# Patient Record
Sex: FEMALE | Race: WHITE | NOT HISPANIC OR LATINO | Employment: FULL TIME | URBAN - METROPOLITAN AREA
[De-identification: names, ages, dates, MRNs, and addresses within clinical notes are randomized per-mention and may not be internally consistent; named-entity substitution may affect disease eponyms.]

---

## 2020-05-29 ENCOUNTER — OFFICE VISIT (OUTPATIENT)
Dept: NEUROLOGY | Facility: CLINIC | Age: 70
End: 2020-05-29
Payer: COMMERCIAL

## 2020-05-29 VITALS
HEIGHT: 66 IN | DIASTOLIC BLOOD PRESSURE: 83 MMHG | SYSTOLIC BLOOD PRESSURE: 142 MMHG | WEIGHT: 152 LBS | HEART RATE: 70 BPM | BODY MASS INDEX: 24.43 KG/M2

## 2020-05-29 DIAGNOSIS — S34.21XA: ICD-10-CM

## 2020-05-29 DIAGNOSIS — E03.9 HYPOTHYROIDISM, UNSPECIFIED TYPE: ICD-10-CM

## 2020-05-29 PROCEDURE — 1101F PR PT FALLS ASSESS DOC 0-1 FALLS W/OUT INJ PAST YR: ICD-10-PCS | Mod: CPTII,S$GLB,, | Performed by: PSYCHIATRY & NEUROLOGY

## 2020-05-29 PROCEDURE — 99999 PR PBB SHADOW E&M-NEW PATIENT-LVL III: ICD-10-PCS | Mod: PBBFAC,,, | Performed by: PSYCHIATRY & NEUROLOGY

## 2020-05-29 PROCEDURE — 99205 PR OFFICE/OUTPT VISIT, NEW, LEVL V, 60-74 MIN: ICD-10-PCS | Mod: S$GLB,,, | Performed by: PSYCHIATRY & NEUROLOGY

## 2020-05-29 PROCEDURE — 99999 PR PBB SHADOW E&M-NEW PATIENT-LVL III: CPT | Mod: PBBFAC,,, | Performed by: PSYCHIATRY & NEUROLOGY

## 2020-05-29 PROCEDURE — 1101F PT FALLS ASSESS-DOCD LE1/YR: CPT | Mod: CPTII,S$GLB,, | Performed by: PSYCHIATRY & NEUROLOGY

## 2020-05-29 PROCEDURE — 1159F MED LIST DOCD IN RCRD: CPT | Mod: S$GLB,,, | Performed by: PSYCHIATRY & NEUROLOGY

## 2020-05-29 PROCEDURE — 1126F AMNT PAIN NOTED NONE PRSNT: CPT | Mod: S$GLB,,, | Performed by: PSYCHIATRY & NEUROLOGY

## 2020-05-29 PROCEDURE — 99205 OFFICE O/P NEW HI 60 MIN: CPT | Mod: S$GLB,,, | Performed by: PSYCHIATRY & NEUROLOGY

## 2020-05-29 PROCEDURE — 1126F PR PAIN SEVERITY QUANTIFIED, NO PAIN PRESENT: ICD-10-PCS | Mod: S$GLB,,, | Performed by: PSYCHIATRY & NEUROLOGY

## 2020-05-29 PROCEDURE — 1159F PR MEDICATION LIST DOCUMENTED IN MEDICAL RECORD: ICD-10-PCS | Mod: S$GLB,,, | Performed by: PSYCHIATRY & NEUROLOGY

## 2020-05-29 RX ORDER — LEVOTHYROXINE SODIUM 75 UG/1
0.17 TABLET ORAL
COMMUNITY

## 2020-05-29 NOTE — ASSESSMENT & PLAN NOTE
Ms. Nichols is a 68 yo female w/ PMH significant for hypothyroidism is seen in consultation as self-referral for evaluation and management of gait abnormality.    Her exam is notable for L foot drop, decreased L patellar reflex.  Her exam is most consistent with an L5 nerve root compression.    We discussed further potential evaluations with MRI, EMG/NCS, but given her stable course since onset and very mild symptoms at this time, we will hold off.      Recommendations and Plan  -Reviewed lumbar exercises, gave information on sciatica (discussed that instructions provided are similar, but technically not the same diagnosis as sciatica)  -Return to clinic as needed or if symptoms worsen for further workup.

## 2020-05-29 NOTE — PATIENT INSTRUCTIONS
"Search images for "exercises for sciatica"  Sciatica    Sciatica is a condition that causes pain in the lower back that spreads down into the buttock, hip, and leg. Sometimes the leg pain can happen without any back pain. Sciatica happens when a spinal nerve is irritated or has pressure put on it as comes out of the spinal canal in the lower back. This most often happens when a bulge or rupture of a nearby spinal disk presses on the nerve. Sciatica can also be caused by a narrowing of the spinal canal (spinal stenosis) or spasm of the muscle in the buttocks that the sciatic nerve passes through (pyriform muscle). Sciatica is also called lumbar radiculopathy.  Sciatica may begin after a sudden twisting or bending force, such as in a car accident. Or it can happen after a simple awkward movement. In either case, muscle spasm often also happens. Muscle spasm makes the pain worse.  A healthcare provider makes a diagnosis of sciatica from your symptoms and a physical exam. Unless you had an injury from a car accident or fall, you usually wont have X-rays taken at this time. This is because the nerves and disks in your back cant be seen on an X-ray. If the provider sees signs of a compressed nerve, you will need to schedule an MRI scan as an outpatient. Signs of a compressed nerve include loss of strength in a leg.  Most sciatica gets better with medicine, exercise, and physical therapy. If your symptoms continue after at least 3 months of medical treatment, you may need surgery or injections to your lower back.  Home care  Follow these tips when caring for yourself at home:  · You may need to stay in bed the first few days. But as soon as possible, begin sitting up or walking. This will help you avoid problems that come from staying in bed for long periods.  · When in bed, try to find a position that is comfortable. A firm mattress is best. Try lying flat on your back with pillows under your knees. You can also try " lying on your side with your knees bent up toward your chest and a pillow between your knees.  · Avoid sitting for long periods. This puts more stress on your lower back than standing or walking.  · Use heat from a hot shower, hot bath, or heating pad to help ease pain. Massage can also help. You can also try using an ice pack. You can make your own ice pack by putting ice cubes in a plastic bag. Wrap the bag in a thin towel. Try both heat and cold to see which works best. Use the method that feels best for 20 minutes several times a day.  · You may use acetaminophen or ibuprofen to ease pain, unless another pain medicine was prescribed. Note: If you have chronic liver or kidney disease, talk with your healthcare provider before taking these medicines. Also talk with your provider if youve had a stomach ulcer or gastrointestinal bleeding.  ·  lifting methods. Dont lift anything heavier than 15 pounds until all of the pain is gone.Use safe  Follow-up care  Follow up with your healthcare provider, or as advised. You may need physical therapy or additional tests.  If X-rays were taken, a radiologist will look at them. You will be told of any new findings that may affect your care.  When to seek medical advice  Call your healthcare provider right away if any of these occur:  · Pain gets worse even after taking prescribed medicine  · Weakness or numbness in 1 or both legs or hips  · Numbness in your groin or genital area  · You cant control your bowel or bladder  · Fever  · Redness or swelling over your back or spine   Date Last Reviewed: 8/1/2016  © 1805-5341 [employer]. [address] [place] [address], [place] [address]. All rights reserved. This information is not intended as a substitute for professional medical care. Always follow your healthcare professional's instructions.

## 2020-05-29 NOTE — PROGRESS NOTES
Neurology Clinic  Initial Consult    Patient Name: May Morrow  MRN: [id number]    CC: gait abnormality    HPI: May Morrow is a 69 y.o. female w/ PMH significant for hypothyroidism is seen in consultation as self-referral for evaluation and management of gait abnormality.    Noticed 2 months ago that her L foot is falling harder upon the ground when she walks.  Frequently dances, walks multiple miles per day, several days per week.  No changes in sensation.  Not worsening since onset.  May have had a few occasions where she veered towards the R.      Denies back nor leg pain, but is very concerned that these symptoms followed poor posture sitting at her computer related to the quarantine.      Denies recreational drug use, cigarette use, alcohol use.      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     No FMH of similar symptoms.  Maternal uncle with PD, mother has tremor (but no dx of PD).    Review of Systems:  Constitutional: No fevers, chills  Eyes: No acute decrease in vision, nor eye discharge  ENT: No changes in hearing nor sore throat  Respiratory: No shortness of breath nor cough  CV: No chest pain, edema  GI: No blood in stool, nausea, vomiting  : No hematuria, dysuria  Skin: No rashes, pruritis  Neurological: No weakness, confusion. + gait abnormality as above  Psychiatric: No auditory nor visual hallucinations    Movement Review of Symptoms:  Anosmia: no  Dysarthria/Hypophonia: no  Dysphagia/Sialorrhea: no  Depression: no  Cognitive slowing: occasionally forgetful   Hallucinations: no  Impulsivity: no  Constipation: no  Orthostasis: no  Falls: No  Freezing: no  Micrographia: no  Sleep issues:  -BETTY: no  -RBD: no    Past Medical History  Past Medical History:   Diagnosis Date    Hypothyroidism      Any other notable PMH as documented in HPI    Medications    Current Outpatient Medications:     levothyroxine (SYNTHROID) 75 MCG tablet, Take 0.175 mcg by mouth before  "breakfast., Disp: , Rfl:   Any other notable medications as documented in HPI    Allergies  Review of patient's allergies indicates:   Allergen Reactions    Penicillins Rash       Social History  Social History     Socioeconomic History    Marital status:      Spouse name: Not on file    Number of children: Not on file    Years of education: Not on file    Highest education level: Not on file   Occupational History    Not on file   Social Needs    Financial resource strain: Not on file    Food insecurity:     Worry: Not on file     Inability: Not on file    Transportation needs:     Medical: Not on file     Non-medical: Not on file   Tobacco Use    Smoking status: Never Smoker   Substance and Sexual Activity    Alcohol use: Never     Frequency: Never    Drug use: Never    Sexual activity: Yes     Partners: Female     Birth control/protection: Post-menopausal   Lifestyle    Physical activity:     Days per week: Not on file     Minutes per session: Not on file    Stress: Not on file   Relationships    Social connections:     Talks on phone: Not on file     Gets together: Not on file     Attends Judaism service: Not on file     Active member of club or organization: Not on file     Attends meetings of clubs or organizations: Not on file     Relationship status: Not on file   Other Topics Concern    Not on file   Social History Narrative    Not on file     Any other notable Social History as documented in HPI.    Family History  History reviewed. No pertinent family history.  Any other notable FMH as documented in HPI.    Physical Exam  BP (!) 142/83   Pulse 70   Ht 5' 6" (1.676 m)   Wt 68.9 kg (152 lb)   BMI 24.53 kg/m²     BP Readings from Last 3 Encounters:   05/29/20 (!) 142/83       Wt Readings from Last 1 Encounters:   05/29/20 1410 68.9 kg (152 lb)         General: Well-developed, well-groomed. No apparent distress  Eyes: Anicteric, noninjected.  ENT: Normocephalic, atraumatic.  "   Respiratory: Lungs clear to auscultation bilaterally.  No accessory muscle use.  Cardiovascular: Regular rate and rhythm with no murmurs, rubs or gallops.    Abdomen: Normoactive bowel sounds present.  Soft, nontender to palpation.  Skin: No rashes, lesions, nor nodules on exposed areas    Neurologic Exam  The patient is awake, alert and oriented. Language is fluent.  Fund of knowledge and attention are appropriate, able to provide detailed medical history.  Able to follow 3 step reverse embedded commands.    Cranial nerves:   Pupils are round and reactive to light and accommodation.   Visual fields are full to confrontation.    Ocular motility is full in all cardinal positions of gaze.   Facial sensation is normal to light touch.   Facial activation is symmetric.   Hearing is symmetric bilaterally.   Palate elevates symmetrically.   Shoulder elevation is symmetric and full strength bilaterally.   Tongue is midline and neck rotation strength is normal bilaterally.      Motor examination of all extremities demonstrates:  Normal bulk and tone in all four limbs.   No atrophy or fasciculations.   Strength is 5/5 in the upper and lower extremities bilaterally with the exception of 4/5 L 1st toe dorsiflexion.    Sensory examination     Sensation to light touch: intact in BUE and BLE  Sensation to temperature: intact in BUE and BLE    Deep tendon reflexes    N.R. Reflex Left Right   C5 Biceps 1+ 1+    C6 Brachioradialis 0+ 0+   C7 Triceps 0+ 0+   L4 Patellar 1+ 2+   S1 Achilles 2+ 2+    Ankle Clonus Absent  Absent     Foss's Absent  Absent      Plantar Stimulation Equivocal  Equivocal       Gait: Normal casual gait with slight circumduction.  Good arm swing.  L foot drop.      Coordination: Finger to nose is normal bilaterally.  No tremors.    Lab and Test Results  No recent pertinent labs available for review    Images:  No recent pertinent imaging available for review    Assessment and Plan    Problem List Items  Addressed This Visit        Neuro    Injury of spinal nerve root at L5 level    Current Assessment & Plan     Ms. Nichols is a 70 yo female w/ PMH significant for hypothyroidism is seen in consultation as self-referral for evaluation and management of gait abnormality.    Her exam is notable for L foot drop, decreased L patellar reflex.  Her exam is most consistent with an L5 nerve root compression.    We discussed further potential evaluations with MRI, EMG/NCS, but given her stable course since onset and very mild symptoms at this time, we will hold off.      Recommendations and Plan  -Reviewed lumbar exercises, gave information on sciatica (discussed that instructions provided are similar, but technically not the same diagnosis as sciatica)  -Return to clinic as needed or if symptoms worsen for further workup.            Endocrine    Hypothyroidism    Current Assessment & Plan     Continue home synthroid               ==============  Patient seen and examined.  I agree with the history, exam, assessment and plan within the resident's note with any notable exceptions detailed below:              Paolo Gerardo MD, MPH  Division of Movement and Memory Disorders  [facility]

## 2020-07-30 ENCOUNTER — TELEPHONE (OUTPATIENT)
Dept: NEUROLOGY | Facility: CLINIC | Age: 70
End: 2020-07-30

## 2020-07-30 NOTE — TELEPHONE ENCOUNTER
----- Message from Carlos Swartz sent at 7/30/2020  3:17 PM CDT -----  Contact: Patient @ [phone]  Patient requesting a return call regarding the office visit coding error, pls call to discuss

## 2020-07-31 NOTE — TELEPHONE ENCOUNTER
----- Message from Carlos Swartz sent at 7/30/2020  3:17 PM CDT -----  Contact: Patient @ [phone]  Patient requesting a return call regarding the office visit coding error, pls call to discuss

## 2020-07-31 NOTE — TELEPHONE ENCOUNTER
Patient called prior to this and says she spoke to [facility] before.  States the wrong dx code was submitted to her insurance for the visit and it was kicked back.  States the wrong code submitted was for Hypothyroidism. Transferred  to customer service.